# Patient Record
Sex: FEMALE | Race: WHITE
[De-identification: names, ages, dates, MRNs, and addresses within clinical notes are randomized per-mention and may not be internally consistent; named-entity substitution may affect disease eponyms.]

---

## 2018-09-26 ENCOUNTER — HOSPITAL ENCOUNTER (OUTPATIENT)
Dept: HOSPITAL 80 - FSGY | Age: 38
Setting detail: OBSERVATION
LOS: 1 days | Discharge: HOME | End: 2018-09-27
Attending: OBSTETRICS & GYNECOLOGY | Admitting: OBSTETRICS & GYNECOLOGY
Payer: COMMERCIAL

## 2018-09-26 DIAGNOSIS — N81.2: ICD-10-CM

## 2018-09-26 DIAGNOSIS — F43.10: ICD-10-CM

## 2018-09-26 DIAGNOSIS — N81.6: ICD-10-CM

## 2018-09-26 DIAGNOSIS — R10.2: ICD-10-CM

## 2018-09-26 DIAGNOSIS — F42.9: ICD-10-CM

## 2018-09-26 DIAGNOSIS — Z90.710: ICD-10-CM

## 2018-09-26 DIAGNOSIS — M54.16: ICD-10-CM

## 2018-09-26 DIAGNOSIS — F32.9: ICD-10-CM

## 2018-09-26 DIAGNOSIS — F17.210: ICD-10-CM

## 2018-09-26 DIAGNOSIS — F41.9: ICD-10-CM

## 2018-09-26 DIAGNOSIS — N80.3: Primary | ICD-10-CM

## 2018-09-26 PROCEDURE — 0DBW4ZX EXCISION OF PERITONEUM, PERCUTANEOUS ENDOSCOPIC APPROACH, DIAGNOSTIC: ICD-10-PCS | Performed by: OBSTETRICS & GYNECOLOGY

## 2018-09-26 PROCEDURE — 57425 LAPAROSCOPY SURG COLPOPEXY: CPT

## 2018-09-26 PROCEDURE — 8E0W4CZ ROBOTIC ASSISTED PROCEDURE OF TRUNK REGION, PERCUTANEOUS ENDOSCOPIC APPROACH: ICD-10-PCS | Performed by: OBSTETRICS & GYNECOLOGY

## 2018-09-26 PROCEDURE — 0USG4ZZ REPOSITION VAGINA, PERCUTANEOUS ENDOSCOPIC APPROACH: ICD-10-PCS | Performed by: OBSTETRICS & GYNECOLOGY

## 2018-09-26 PROCEDURE — 57250 REPAIR RECTUM & VAGINA: CPT

## 2018-09-26 PROCEDURE — 58662 LAPAROSCOPY EXCISE LESIONS: CPT

## 2018-09-26 RX ADMIN — Medication PRN MCG: at 11:39

## 2018-09-26 RX ADMIN — Medication PRN MCG: at 11:58

## 2018-09-26 RX ADMIN — Medication PRN MCG: at 11:25

## 2018-09-26 RX ADMIN — SIMETHICONE CHEW TAB 80 MG SCH MG: 80 TABLET ORAL at 16:24

## 2018-09-26 RX ADMIN — SIMETHICONE CHEW TAB 80 MG SCH MG: 80 TABLET ORAL at 22:16

## 2018-09-26 RX ADMIN — OXYCODONE HYDROCHLORIDE AND ACETAMINOPHEN PRN TAB: 5; 325 TABLET ORAL at 21:16

## 2018-09-26 RX ADMIN — Medication PRN MCG: at 11:18

## 2018-09-26 RX ADMIN — OXYCODONE HYDROCHLORIDE AND ACETAMINOPHEN PRN TAB: 5; 325 TABLET ORAL at 12:22

## 2018-09-26 RX ADMIN — PROMETHAZINE HYDROCHLORIDE PRN MG: 25 INJECTION INTRAMUSCULAR; INTRAVENOUS at 13:42

## 2018-09-26 RX ADMIN — PROMETHAZINE HYDROCHLORIDE PRN MG: 25 INJECTION INTRAMUSCULAR; INTRAVENOUS at 21:19

## 2018-09-26 RX ADMIN — Medication PRN MCG: at 11:09

## 2018-09-26 RX ADMIN — Medication PRN MCG: at 12:24

## 2018-09-26 RX ADMIN — KETOROLAC TROMETHAMINE SCH MG: 15 INJECTION, SOLUTION INTRAMUSCULAR; INTRAVENOUS at 17:44

## 2018-09-26 RX ADMIN — OXYCODONE HYDROCHLORIDE AND ACETAMINOPHEN PRN TAB: 5; 325 TABLET ORAL at 16:24

## 2018-09-26 NOTE — GOP
DATE OF OPERATION:  09/26/2018



SURGEON:  Albert Ricks MD



ASSISTANT:  Ashley Mcdonald CFA



ANESTHESIA:  General.



PREOPERATIVE DIAGNOSIS:  

1.  Endometriosis.

2.  Pelvic pain.

3.  Symptomatic rectocele.

4.  Second degree cystocele.



POSTOPERATIVE DIAGNOSIS:  

1.  Endometriosis.

2.  Pelvic pain.

3.  Symptomatic rectocele.

4.  Second degree cystocele.



PROCEDURE PERFORMED:  

1.  Robotic excision of endometriosis.

2.  Bilateral ureterolysis.

3.  Bilateral uterosacral ligament colpopexy.

4.  Rectocele repair with perineorrhaphy.



FINDINGS:  



SPECIMENS:  Pelvic peritoneum with endometriosis.



ESTIMATED BLOOD LOSS:  Scant.



DESCRIPTION OF PROCEDURE:  The patient was taken to the operating room where she was identified.  Gen
eral anesthesia was administered and found to be adequate.  She was placed in the lithotomy position 
and prepared and draped in normal sterile fashion.  A El catheter was placed in her bladder. 



A 1 cm infraumbilical incision was made through her prior surgical scar.  The Veress needle with CO2 
gas flowing was advanced into the peritoneal cavity.  The abdomen was then insufflated with carbon di
oxide gas.  The 12 mm trocar, followed by the laparoscope were then inserted.  The upper abdomen was 
unremarkable.  There was no evidence of endometriosis on either diaphragm, liver, stomach, gall bladd
er, or bowel.  Her appendix appeared to be free of endometriosis.  There may have been a small proxim
al phlebolith present.  Photographs were taken.  Within the pelvis, there was endometriosis along the
 vaginal cuff, posterior cul-de-sac, and bilateral ovarian fossae overlying both ureters. 



Two lateral ports were placed on the right and 1 left under direct visualization.  She was then posit
ioned in Trendelenburg position and the da Jose robot docked on the left side.  The instruments were
 then brought into the abdominal cavity under direct visualization. 



The posterior cul-de-sac peritoneum from the distal rectum up to and over the vaginal cuff was then e
xcised.  This extended laterally to the uterosacral ligaments.  A bilateral ureterolysis was required
 given endometriosis along both ureters.  The peritoneum at the pelvic brims was incised.  The ureter
 was gently dissected free and lateralized in the pelvic brim all the way down to the bladder.  Once 
this was accomplished, the entire ovarian fossa peritoneum was completely excised bilaterally.  The p
lidia was then irrigated with sterile saline and hemostasis was present. 



A bilateral colpopexy was performed by attaching the lateral aspects of the vaginal cuff to the ipsil
ateral uterosacral ligaments near the coccygeus-sacrospinous ligament complexes.  This was accomplish
ed with 0 Ethibond suture.  The robot was then undocked.  The fascia was closed with 0 Vicryl, skin w
ith 4-0 Monocryl. 



A transverse incision was then made along the perineal body.  The posterior vaginal epithelium was un
dermined with the Metzenbaum scissors and incised sagittally.  The epithelium was then gently dissect
ed off the underlying rectovaginal connective tissue.  The connective tissue was plicated with interr
upted sutures of 0 Vicryl.  The bulbous spongiosis and transverse perineal muscles were then plicated
.  The excess epithelium was then trimmed and closed with a running 2-0 Vicryl suture.  Anesthesia wa
s reversed.  The patient taken the PACU awake, in stable condition.



COMPLICATIONS:  None.



DISPOSITION:  Patient stable to PACU.





Job #:  [id number]

## 2018-09-26 NOTE — POSTOPPROG
Post Op Note


Date of Operation: 09/26/18


Surgeon: Albert Ricks


Assistant: Ashley Mcdonald


Pre-op Diagnosis: Endometriosis


Post-op Diagnosis: Same


Procedure: Robotic excision of endo, bilat ureterolysis, US Lig colpopexy, 

rectocele r


Findings: Endo


Inf/Abcess present in the surg proc area at time of surgery?: No


EBL: Minimal


Complications: 





None

## 2018-09-26 NOTE — PDGENHP
History and Physical


History and Physical: 





Assessment and Plan:


1. Endometriosis


Evy appears to have persistent and incompletely treated endometriosis and 

pelvic pain. She is scheduled for robotic excision of endometriosis tomorrow. I 

also will repair her symptomatic rectocele. The risks, benefits, and 

alternatives were presented and informed consent obtained.





2. Pelvic pain in female





3. Rectocele





Subjective:





Patient ID: Evy Nunn is a 38 y.o. female who presents to [facility] 

[facility] for endometriosis.





HPI


I called Evy today for a phone consult per her request. She is interested 

in excision surgery for endometriosis. She has a complicated history of 

endometriosis and chronic pain. Her previous surgeries are listed below with 

findings:





1. - BL tubal ligation: found endo, not removed


2. - Bladder surgery for hydrodistention


3. - Vaginal hyst with removal of cervix, endo removed


4. - Diagnostic Lap with blue dye, endo fulgurated


5. - BL salpingectomy and oopherectomy with endo fulgur.





She was told that she had endometriosis "everywhere".


She has never had excisional surgery. She typically feels better for three 

months after surgery, at which point the pain returns and continues to worsen. 

She is currently 6 months post op from her last surgery and is in debilitating 

pain, unable to function and is currently on disability.





Evy reports menses started at age 14, and was very painful and heavy. She 

would frequently miss school. She required super plus tampons changed [id number] 

for 6 days, all heavy flow. She started OCP at age 21 in a continuous fashion, 

but continued to bleed. She has tried multiple OCP as well as lupron for three 

months, but could not tolerate side effects. Her OB history is . Both 

births were full term, she has an 18 year old and an 8 year old. The first 

delivery erquired foreceps. Her periods would typically be very painful and 

feel like her "uterus was dragging out" with extreme lethargy, nausea, 

dizziness. 





Her daily chronic pain is "shooting' down her legs (has been diagnosed as 

radiculopathy) that starts at the belly button and goes all the way down to her 

feet. It is constant, throbbing, stabbing and aching. It is 9.5/10 daily. It is 

worse with movement and better with rest. She also has dyspareunia, but is too 

painful to have intercourse now. It was temporarily better after her ovaries 

were removed. It is deep and will often last for days after intercourse. She 

also complains of dyschezia that is very sharp when gas or bowel are moving. 

She will often have to stop, grab onto something and wait for the pain to pass. 

She has constant nausea and takes phenergen daily. She is on cymbalta, percocet

, diazepam, ibupforen, methocarbamol, senna for pain and bowel symptoms. She 

sees pain mangement for prescriptions. She has a history of very traumatic 

experiences with long-acting pain meds and withdrawal symptoms. 





She also feels a vaginal bulge and notes stool gets caught within the bulge 

making it difficult to fully evacuate.





She was diagnosed with interstitial cystitis in  by a urogynecologist in 

[place]. She has constant burning in the bladder , frequency, and pain with full 

bladder as well as emptying the bladder. She curerntly manages with diet. She 

was previously on medications, but either had side effects or lack of insurance 

coverage. 





She has tried PFPT, but could not afford to continue. It did help a little 

externally. She has concerns for endo on the bowel due to her extremely painful 

bowel symptoms. She has anxiety and panic attacks frequently. She takes 

diazepam prm and is on cymbalta. 





Evy lives in [place], is on disability, has two children. She is 

currently on estradiol 2 mg daily since her oopherectomy. 





PMH: anxiety, MDD, PTCS, OCD


SH: see above


Meds: see chart


Ht: 5'2"


Wt: 110





She smokes <1PPD, does not drink alcohol, and uses marijuana edibles for pain 

and nausea. 





PastMedicalHistory


Past Medical History:


Diagnosis   Date


   Anxiety   


   Endometriosis   


   Interstitial cystitis   


   MDD (major depressive disorder)   


   OCD (obsessive compulsive disorder)   


   PTSD (post-traumatic stress disorder)   








PastSurgicalHistory


Past Surgical History:


Procedure   Laterality   Date


   BLADDER SURGERY      


   for hydrodystention


   HYSTERECTOMY, TOTAL VAGINAL      


   with endometriosis fulguration


   LAPAROSCOPY      


   diagnostic with blue dye


   OVARY REMOVAL      


   PELVIC LAPAROSCOPY      


   SALPINGO-OOPHORECTOMY   Bilateral   


   Wit hDr. Espenoza in [place], endo fulguration


   TMJ ARTHROSCOPY      


   TUBAL LIGATION   Bilateral   2010


   WISDOM TOOTH EXTRACTION      











CURRENT MEDICATIONS: 


Current Outpatient Medications


Medication   Sig


   diazePAM (VALIUM) 2 mg tablet   Take 2 mg by mouth daily as needed for 

Anxiety.


   DULoxetine (CYMBALTA) 30 mg DR capsule   Take 90 mg by mouth daily.


   estradiol (ESTRACE) 2 mg tablet   Take 2 mg by mouth daily.


   hyoscyamine (ANASPAZ,LEVSIN) 0.125 mg tablet   Take 0.125 mg by mouth every 

6 hours as needed for Cramping.


   Ibuprofen 100 mg tablet   Take 800 mg by mouth 2 times daily.


   methocarbamol (ROBAXIN) 500 mg tablet   Take 500 mg by mouth 2 times daily.


   oxyCODONE-acetaminophen (PERCOCET)  mg Tb   Take 1 tablet by mouth 3 

times daily. This med has acetaminophen (APAP).


   promethazine (PHENERGAN) 25 mg tablet   Take 25 mg by mouth 2 times daily as 

needed for Nausea.


   sennosides (SENNA PO)   


   valACYclovir (VALTREX) 500 mg tablet   Take 500 mg by mouth daily.





No current facility-administered medications for this visit. 








ALLERGIES: Dilaudid [hydromorphone] and Gabapentin





I have reviewed, verified and agree with the past medical, surgical, birth, 

family, social  and ROS history as documented by the RN today.





Objective:


Vital Signs: 


Visit Vitals


BP   100/64


Pulse   115


Temp   36.6 C (97.9 F) (Temporal)


Resp   16


Ht   1.568 m (5' 1.75")


Wt   50 kg (110 lb 3.2 oz)


SpO2   96%


BMI   20.32 kg/m








Physical Exam


Gen: This is an alert, well developed woman in no distress.


Neuro: She moves all extremities.


Psych: She is appropriate, oriented, with normal affect.


Neck: No thyroid enlargement, adenopathy, or tenderness.


Lungs: Clear to ascultation, no wheezes or rales.


Heart: Regular rate and rhythm without obvious murmurs.


Abdomen: Soft, non-tender, without guarding, rebound, or masses.


Extremities: No edema or cyanosis.


Pelvic: Normal external genitalia. Non-gaping introitus, vagina without 

discharge, adequately estrogenized, grade 3 low rectocele. She is tender 

throughout the pelvis especially the left half of the vaginal cuff and the 

posterior cul-de-sac.


DATA:


I have reviewed the pertinent medical records. 





  


TIME/COMMUNICATION:


I personally spent a total of 60 minutes. Of that 45 minutes was counseling/

coordination of patient's care. See my note above for details.





Albert Ricks MD


Board Certified Female Pelvic Medicine and Reconstructive Surgery


Director of Minimally Invasive Gynecologic Surgery, [facility]


[facility] Surgeon in Minimally Invasive Gynecologic Surgery


[facility] Surgeon in Robotic Surgery

## 2018-09-26 NOTE — PDANEPAE
ANE History of Present Illness





endometriosis s/f excision, DaVincpolly assisted.





ANE Past Medical History





- Cardiovascular History


Hx Hypertension: No


Hx Arrhythmias: No


Hx Chest Pain: No


Hx Coronary Artery / Peripheral Vascular Disease: No


Hx CHF / Valvular Disease: No


Hx Palpitations: No





- Pulmonary History


Hx COPD: No


Hx Asthma/Reactive Airway Disease: Yes


Hx Recent Upper Respiratory Infection: No


Hx Oxygen in Use at Home: No


Hx Sleep Apnea: No


Sleep Apnea Screening Result - Last Documented: Negative


Pulmonary History Comment: No diagnosis of asthma but states sheuses the MDI 

when she smokes too much.





- Neurologic History


Hx Cerebrovascular Accident: No


Hx Seizures: No


Hx Dementia: No





- Endocrine History


Hx Diabetes: No





- Renal History


Hx Renal Disorders: No


Renal History Comment: hx of kidney stones





- Liver History


Hx Hepatic Disorders: No





- Neurological & Psychiatric Hx


Hx Neurological and Psychiatric Disorders: Yes


Neurological / Psychiatric History Comment: anxiety disorder, major depressive 

disorder, PTSD, mild OCD





- Cancer History


Hx Cancer: No





- Congenital Disorder History


Hx Congenital Disorders: No





- GI History


Hx Gastrointestinal Disorders: No


Gastrointestinal History Comment: pain related to endometriosis when moving 

bowels





- Other Health History


Other Health History: Wears glasses.  Hyperpigmentation of skin on face.  Severe

, recurring endometriosis.  Does have high tolerance to pain, sees a pain 

specialist currently for endo pain.  States has extra vessels in abd around 

umbilicus that are not usually there and caused a lot of bleeding with last 

surgery





- Chronic Pain History


Chronic Pain: Yes





- Surgical History


Prior Surgeries: Bilat oopherectomy, salpingectomy, w/endo removal 7/2017.  

Laparoscopy, 1/2014.  Hysterectomy w/endo removal, 7/2012.  Hydrodistention, 5/ 2012.  Laparoscopy w/endo removal, 5/2010.  tubal w/ endometriosis removal 3/

2010





ANE Review of Systems


Review of Systems: 








- Exercise capacity


METS (RN): 4 METS





ANE Patient History





- Allergies


Allergies/Adverse Reactions: 








gabapentin Allergy (Verified 09/26/18 08:54)


 


hydromorphone [From Dilaudid] Allergy (Verified 09/26/18 08:55)


 








- Home Medications


Home medications: home medication list seen and reviewed


Home Medications: 








Albuterol  09/12/18 [Last Taken 09/19/18]


Cbd Oil  09/12/18 [Last Taken 1 Week Ago ~09/19/18]


DULoxetine  09/12/18 [Last Taken 09/25/18 09:00]


Diazepam  09/12/18 [Last Taken 09/21/18]


Estradiol  09/12/18 [Last Taken 09/25/18 09:00]


Hyoscyamine Sulfate Sr  09/12/18 [Last Taken 1 Month Ago ~08/26/18]


Ibuprofen  09/12/18 [Last Taken 09/19/18]


Methocarbamol  09/12/18 [Last Taken 09/25/18 09:00]


Percocet  mg Tablet  09/12/18 [Last Taken 09/26/18 03:00]


Promethazine HCl  09/12/18 [Last Taken 09/25/18 09:00]


Turmeric  09/12/18 [Last Taken 09/19/18]


Vitamin B Complex  09/12/18 [Last Taken 09/19/18]


valACYclovir  09/12/18 [Last Taken 09/25/18 09:00]








- NPO status


NPO Status: no food or drink >8 hours


NPO Since - Liquids (Date): 09/26/18


NPO Since - Liquids (Time): 08:50


NPO Since - Solids (Date): 09/25/18


NPO Since - Solids (Time): 22:00





- Anes Hx


Anes Hx: no prior problems





- Smoking Hx


Smoking Status: Current every day smoker





- Alcohol Use


Alcohol Use: None





- Family Anes Hx


Family Anes Hx: none


Family Hx Anesthesia Complications: none





ANE Labs/Vital Signs





- Vital Signs


Blood Pressure: 130/89


Heart Rate: 106


Respiratory Rate: 13


O2 Sat (%): 97


Height: 157.48 cm


Weight: 49.895 kg





ANE Physical Exam





- Airway


Neck exam: FROM


Mallampati Score: Class 2


Mouth exam: normal dental/mouth exam





- Pulmonary


Pulmonary: no respiratory distress





- Cardiovascular


Cardiovascular: regular rate and rhythym





- ASA Status


ASA Status: II





ANE Anesthesia Plan


Anesthesia Plan: general endotracheal anesthesia

## 2018-09-27 VITALS — DIASTOLIC BLOOD PRESSURE: 95 MMHG | SYSTOLIC BLOOD PRESSURE: 141 MMHG

## 2018-09-27 RX ADMIN — OXYCODONE HYDROCHLORIDE PRN MG: 15 TABLET ORAL at 09:00

## 2018-09-27 RX ADMIN — KETOROLAC TROMETHAMINE SCH MG: 15 INJECTION, SOLUTION INTRAMUSCULAR; INTRAVENOUS at 12:10

## 2018-09-27 RX ADMIN — OXYCODONE HYDROCHLORIDE PRN MG: 15 TABLET ORAL at 13:14

## 2018-09-27 RX ADMIN — KETOROLAC TROMETHAMINE SCH MG: 15 INJECTION, SOLUTION INTRAMUSCULAR; INTRAVENOUS at 05:55

## 2018-09-27 RX ADMIN — OXYCODONE HYDROCHLORIDE AND ACETAMINOPHEN PRN TAB: 5; 325 TABLET ORAL at 04:53

## 2018-09-27 RX ADMIN — KETOROLAC TROMETHAMINE SCH MG: 15 INJECTION, SOLUTION INTRAMUSCULAR; INTRAVENOUS at 00:03
